# Patient Record
Sex: FEMALE | Race: WHITE
[De-identification: names, ages, dates, MRNs, and addresses within clinical notes are randomized per-mention and may not be internally consistent; named-entity substitution may affect disease eponyms.]

---

## 2019-09-11 ENCOUNTER — HOSPITAL ENCOUNTER (EMERGENCY)
Dept: HOSPITAL 63 - ER | Age: 29
Discharge: ANOTHER HOSPITAL | End: 2019-09-11
Payer: COMMERCIAL

## 2019-09-11 ENCOUNTER — HOSPITAL ENCOUNTER (INPATIENT)
Dept: HOSPITAL 61 - 4 NORTH | Age: 29
LOS: 1 days | Discharge: HOME | DRG: 343 | End: 2019-09-12
Attending: FAMILY MEDICINE | Admitting: FAMILY MEDICINE
Payer: COMMERCIAL

## 2019-09-11 VITALS — SYSTOLIC BLOOD PRESSURE: 99 MMHG | DIASTOLIC BLOOD PRESSURE: 52 MMHG

## 2019-09-11 VITALS — DIASTOLIC BLOOD PRESSURE: 64 MMHG | SYSTOLIC BLOOD PRESSURE: 110 MMHG

## 2019-09-11 VITALS — SYSTOLIC BLOOD PRESSURE: 116 MMHG | DIASTOLIC BLOOD PRESSURE: 68 MMHG

## 2019-09-11 VITALS — HEIGHT: 71 IN | WEIGHT: 179.12 LBS | BODY MASS INDEX: 25.08 KG/M2

## 2019-09-11 VITALS — DIASTOLIC BLOOD PRESSURE: 62 MMHG | SYSTOLIC BLOOD PRESSURE: 113 MMHG

## 2019-09-11 VITALS — DIASTOLIC BLOOD PRESSURE: 61 MMHG | SYSTOLIC BLOOD PRESSURE: 110 MMHG

## 2019-09-11 VITALS — DIASTOLIC BLOOD PRESSURE: 64 MMHG | SYSTOLIC BLOOD PRESSURE: 109 MMHG

## 2019-09-11 VITALS — SYSTOLIC BLOOD PRESSURE: 111 MMHG | DIASTOLIC BLOOD PRESSURE: 67 MMHG

## 2019-09-11 VITALS — SYSTOLIC BLOOD PRESSURE: 115 MMHG | DIASTOLIC BLOOD PRESSURE: 63 MMHG

## 2019-09-11 VITALS — DIASTOLIC BLOOD PRESSURE: 51 MMHG | SYSTOLIC BLOOD PRESSURE: 97 MMHG

## 2019-09-11 VITALS — SYSTOLIC BLOOD PRESSURE: 109 MMHG | DIASTOLIC BLOOD PRESSURE: 64 MMHG

## 2019-09-11 DIAGNOSIS — Z79.899: ICD-10-CM

## 2019-09-11 DIAGNOSIS — K35.80: Primary | ICD-10-CM

## 2019-09-11 LAB
ALBUMIN SERPL-MCNC: 3.9 G/DL (ref 3.4–5)
ALBUMIN SERPL-MCNC: 3.9 G/DL (ref 3.4–5)
ALBUMIN/GLOB SERPL: 1.1 {RATIO} (ref 1–1.7)
ALBUMIN/GLOB SERPL: 1.1 {RATIO} (ref 1–1.7)
ALP SERPL-CCNC: 53 U/L (ref 46–116)
ALP SERPL-CCNC: 53 U/L (ref 46–116)
ALT SERPL-CCNC: 17 U/L (ref 14–59)
ALT SERPL-CCNC: 20 U/L (ref 14–59)
ANION GAP SERPL CALC-SCNC: 10 MMOL/L (ref 6–14)
ANION GAP SERPL CALC-SCNC: 10 MMOL/L (ref 6–14)
APTT PPP: YELLOW S
AST SERPL-CCNC: 16 U/L (ref 15–37)
AST SERPL-CCNC: 18 U/L (ref 15–37)
BACTERIA #/AREA URNS HPF: 0 /HPF
BASOPHILS # BLD AUTO: 0 X10^3/UL (ref 0–0.2)
BASOPHILS # BLD AUTO: 0.1 X10^3/UL (ref 0–0.2)
BASOPHILS NFR BLD: 0 % (ref 0–3)
BASOPHILS NFR BLD: 1 % (ref 0–3)
BILIRUB SERPL-MCNC: 0.2 MG/DL (ref 0.2–1)
BILIRUB SERPL-MCNC: 0.6 MG/DL (ref 0.2–1)
BILIRUB UR QL STRIP: (no result)
BUN SERPL-MCNC: 9 MG/DL (ref 7–20)
BUN/CREAT SERPL: 11 (ref 6–20)
BUN/CREAT SERPL: 12 (ref 6–20)
CA-I SERPL ISE-MCNC: 12 MG/DL (ref 7–20)
CALCIUM SERPL-MCNC: 9.2 MG/DL (ref 8.5–10.1)
CALCIUM SERPL-MCNC: 9.5 MG/DL (ref 8.5–10.1)
CHLORIDE SERPL-SCNC: 103 MMOL/L (ref 98–107)
CHLORIDE SERPL-SCNC: 106 MMOL/L (ref 98–107)
CO2 SERPL-SCNC: 28 MMOL/L (ref 21–32)
CO2 SERPL-SCNC: 29 MMOL/L (ref 21–32)
CREAT SERPL-MCNC: 0.8 MG/DL (ref 0.6–1)
CREAT SERPL-MCNC: 1 MG/DL (ref 0.6–1)
EOSINOPHIL NFR BLD: 0 % (ref 0–3)
EOSINOPHIL NFR BLD: 0 % (ref 0–3)
EOSINOPHIL NFR BLD: 0 X10^3/UL (ref 0–0.7)
EOSINOPHIL NFR BLD: 0 X10^3/UL (ref 0–0.7)
ERYTHROCYTE [DISTWIDTH] IN BLOOD BY AUTOMATED COUNT: 12.7 % (ref 11.5–14.5)
ERYTHROCYTE [DISTWIDTH] IN BLOOD BY AUTOMATED COUNT: 12.9 % (ref 11.5–14.5)
FIBRINOGEN PPP-MCNC: CLEAR MG/DL
GFR SERPLBLD BASED ON 1.73 SQ M-ARVRAT: 65.1 ML/MIN
GFR SERPLBLD BASED ON 1.73 SQ M-ARVRAT: 84.2 ML/MIN
GLOBULIN SER-MCNC: 3.6 G/DL (ref 2.2–3.8)
GLOBULIN SER-MCNC: 3.6 G/DL (ref 2.2–3.8)
GLUCOSE SERPL-MCNC: 105 MG/DL (ref 70–99)
GLUCOSE SERPL-MCNC: 89 MG/DL (ref 70–99)
GLUCOSE UR STRIP-MCNC: (no result) MG/DL
HCT VFR BLD CALC: 40.9 % (ref 36–47)
HCT VFR BLD CALC: 42.5 % (ref 36–47)
HGB BLD-MCNC: 14 G/DL (ref 12–15.5)
HGB BLD-MCNC: 14.1 G/DL (ref 12–15.5)
LIPASE: 136 U/L (ref 73–393)
LYMPHOCYTES # BLD: 1.7 X10^3/UL (ref 1–4.8)
LYMPHOCYTES # BLD: 1.7 X10^3/UL (ref 1–4.8)
LYMPHOCYTES NFR BLD AUTO: 16 % (ref 24–48)
LYMPHOCYTES NFR BLD AUTO: 16 % (ref 24–48)
MCH RBC QN AUTO: 29 PG (ref 25–35)
MCH RBC QN AUTO: 30 PG (ref 25–35)
MCHC RBC AUTO-ENTMCNC: 33 G/DL (ref 31–37)
MCHC RBC AUTO-ENTMCNC: 34 G/DL (ref 31–37)
MCV RBC AUTO: 87 FL (ref 79–100)
MCV RBC AUTO: 88 FL (ref 79–100)
MONO #: 0.5 X10^3/UL (ref 0–1.1)
MONO #: 0.6 X10^3/UL (ref 0–1.1)
MONOCYTES NFR BLD: 5 % (ref 0–9)
MONOCYTES NFR BLD: 5 % (ref 0–9)
NEUT #: 8.6 X10^3/UL (ref 1.8–7.7)
NEUT #: 8.6 X10^3UL (ref 1.8–7.7)
NEUTROPHILS NFR BLD AUTO: 78 % (ref 31–73)
NEUTROPHILS NFR BLD AUTO: 79 % (ref 31–73)
NITRITE UR QL STRIP: (no result)
PLATELET # BLD AUTO: 256 X10^3/UL (ref 140–400)
PLATELET # BLD AUTO: 278 X10^3/UL (ref 140–400)
POTASSIUM SERPL-SCNC: 3.6 MMOL/L (ref 3.5–5.1)
POTASSIUM SERPL-SCNC: 3.9 MMOL/L (ref 3.5–5.1)
PROT SERPL-MCNC: 7.5 G/DL (ref 6.4–8.2)
PROT SERPL-MCNC: 7.5 G/DL (ref 6.4–8.2)
RBC # BLD AUTO: 4.72 X10^6/UL (ref 3.5–5.4)
RBC # BLD AUTO: 4.81 X10^6/UL (ref 3.5–5.4)
RBC #/AREA URNS HPF: 0 /HPF (ref 0–2)
SODIUM SERPL-SCNC: 142 MMOL/L (ref 136–145)
SODIUM SERPL-SCNC: 144 MMOL/L (ref 136–145)
SP GR UR STRIP: 1.01
SQUAMOUS #/AREA URNS LPF: (no result) /LPF
UROBILINOGEN UR-MCNC: 0.2 MG/DL
WBC # BLD AUTO: 10.9 X10^3/UL (ref 4–11)
WBC # BLD AUTO: 10.9 X10^3/UL (ref 4–11)
WBC #/AREA URNS HPF: (no result) /HPF (ref 0–4)

## 2019-09-11 PROCEDURE — 80048 BASIC METABOLIC PNL TOTAL CA: CPT

## 2019-09-11 PROCEDURE — 80053 COMPREHEN METABOLIC PANEL: CPT

## 2019-09-11 PROCEDURE — 85025 COMPLETE CBC W/AUTO DIFF WBC: CPT

## 2019-09-11 PROCEDURE — 81025 URINE PREGNANCY TEST: CPT

## 2019-09-11 PROCEDURE — 36415 COLL VENOUS BLD VENIPUNCTURE: CPT

## 2019-09-11 PROCEDURE — 81001 URINALYSIS AUTO W/SCOPE: CPT

## 2019-09-11 PROCEDURE — G0378 HOSPITAL OBSERVATION PER HR: HCPCS

## 2019-09-11 PROCEDURE — 83690 ASSAY OF LIPASE: CPT

## 2019-09-11 PROCEDURE — 74177 CT ABD & PELVIS W/CONTRAST: CPT

## 2019-09-11 PROCEDURE — 0DTJ4ZZ RESECTION OF APPENDIX, PERCUTANEOUS ENDOSCOPIC APPROACH: ICD-10-PCS | Performed by: SURGERY

## 2019-09-11 PROCEDURE — 96365 THER/PROPH/DIAG IV INF INIT: CPT

## 2019-09-11 PROCEDURE — 99285 EMERGENCY DEPT VISIT HI MDM: CPT

## 2019-09-11 PROCEDURE — A7015 AEROSOL MASK USED W NEBULIZE: HCPCS

## 2019-09-11 NOTE — PDOC4
Operative Note


Operative Note


Preoperative Diagnosis: Acute Appendicitis





Postoperative Diagnosis: Same





Procedure: Laparoscopic appendectomy





Surgeon: Kahlil





Anesthesia: Gen.





EBL: 10 mL





Specimen: Appendix to pathology





Drains: None





Complications: None





Indication: The patient is a 28-year-old female who reported to the emergency 

department with abdominal pain. The evaluation is consistent with acute 

appendicitis. The patient was offered surgical treatment with a laparoscopic 

appendectomy. The risks of surgery were discussed which include bleeding, 

infection, visceral injury, pain, anesthetic risk, potential need for additional

surgery or procedure. The patient understands and would like to proceed.





Description:  The patient was taken to the operating room and placed supine on 

the operating table. Gen. anesthesia was performed. The abdomen was prepped with

ChloraPrep and draped in a standard surgical manner. A supraumbilical incision 

was made through which a veress needle was inserted and a  pneumoperitoneum was 

created.  A visualized 5 mm trocar was inserted and the laparoscope was intr

oduced.  In the left lower quadrant a 5 mm trocar was inserted. In the 

suprapubic region a 12 mm trocar was inserted.  The appendix was identified and 

appeared inflamed consistent with early acute appendicitis. There was no clear 

evidence of perforation or periappendiceal abscess. The mesoappendix was bluntly

 from the appendix. The mesoappendix was controlled using several clips

and it was divided. The appendix was then amputated off the cecum using an Endo 

KATHI 45 stapling device. The appendix was then placed in an endoscopic bag and 

extracted at the suprapubic incision site. The fascia there was closed with 0 

Vicryl and infiltrated with half percent Marcaine with epinephrine.  The RLQ was

visualized and the staple line appeared well intact and hemostasis was good. No 

other abnormalities were identified grossly. The remaining ports were removed 

and the pneumoperitoneum was relieved. The skin at all incision sites was closed

with 4-0 Monocryl. Steri-Strips and dressings were applied. The patient tole

rated the procedure well and was sent to the recovery room in stable condition. 

At the end of the case all counts were correct.











RAUL BYNUM MD             Sep 11, 2019 17:09

## 2019-09-11 NOTE — RAD
CT ABD PELV W/ IV CONTRST ONLY 

 

History: Right lower quadrant abdominal pain 

 

Comparison: None.

 

Technique: After administration of intravenous contrast, helical CT of the

abdomen and pelvis was performed from the lung bases through the ischial 

tuberosities. Coronal and sagittal reconstructions were obtained. 75 mL of

Omnipaque 300 were used. One or more of the following dose reduction 

techniques were utilized: Automated exposure control (AEC), Adjustment of 

mA and/or kV according to patient size, Use of iterative reconstruction 

technique such as ASiR, CT scan done according to ALARA and image 

gently/image wisely

 

Abdomen Findings:

The visualized lung bases are clear.

 

The liver, gallbladder, pancreas, spleen, and bilateral adrenal glands are

normal. 

 

Symmetric renal enhancement. There is no focal renal mass. There is no 

hydronephrosis.   

 

The visualized loops of small bowel are normal. The visualized loops of 

large bowel are normal. There is no evidence of bowel obstruction. 

Slightly dilated appendix with mucosal hyperemia measuring 7 mm in 

diameter. Mild periappendiceal inflammation. No free air. No organized 

fluid collection. 

 

There is no mesenteric or retroperitoneal adenopathy. 

 

The abdominal aorta is normal in caliber. 

 

Pelvis Findings:

Urinary bladder is normal. Trace pelvic free fluid. There is no pelvic or 

inguinal adenopathy.  2.6 cm right ovarian cyst, incompletely 

characterized. Septate uterus.

 

There is no acute bony abnormality.  

 

IMPRESSION:

Slightly dilated and hyperemic appendix with mild surrounding 

inflammation, concerning for early/developing acute appendicitis. No free 

air. No organized fluid collection.

 

Incidental note of a suspected septate uterus.

 

Electronically signed by: Beau Lopez MD (9/11/2019 6:49 AM) [facility]

## 2019-09-11 NOTE — PDOC2
CONSULT


Date of Consult


Date of Consult


DATE: 19 


TIME: 13:40





History of Present Illness


Reason for Visit:


The patient is a 28-year-old female who noticed abdominal pain that started 

yesterday. The pain was initially in the lower midabdomen but earlier this 

morning localized to the right lower quadrant. She denies any associated nausea 

or vomiting but has felt "feverish".  She reported to the emergency department 

at [facility] and her evaluation was suggestive of possible appendicitis. She 

was transferred for definitive care.





Past Medical History


Past Medical History


Denies





Past Surgical History


Past Surgical History








Family History


Family History:  High Cholestrol





Social History


Social History:  Parent


No


ALCOHOL:  none


Drugs:  None





Current Medications


Current Medications





Current Medications


Bupivacaine HCl/ Epinephrine Bitart (Sensorcain-Epi 0.5%-1:230209 Mpf) 30 ml 1X 

ONCE INJ ;  Start 19 at 12:30;  Stop 19 at 12:31;  Status DC


Ondansetron HCl (Zofran) 4 mg PRN Q6HRS  PRN IV NAUSEA/VOMITING;  Start 19 

at 13:00;  Stop 19 at 20:00


Fentanyl Citrate (Fentanyl 2ml Vial) 25 mcg PRN Q5MIN  PRN IV MILD PAIN 1-3;  

Start 19 at 13:00;  Stop 19 at 20:00


Fentanyl Citrate (Fentanyl 2ml Vial) 50 mcg PRN Q5MIN  PRN IV MODERATE TO SEVERE

PAIN;  Start 19 at 13:00;  Stop 19 at 20:00


Morphine Sulfate (Morphine Sulfate) 1 mg PRN Q10MIN  PRN IV SEVERE PAIN 7-10;  

Start 19 at 13:00;  Stop 19 at 20:00


Ringer's Solution 1,000 ml @  30 mls/hr Q24H IV ;  Start 19 at 12:47;  Stop

19 at 00:46


Hydromorphone HCl (Dilaudid) 0.5 mg PRN Q10MIN  PRN IV SEV PAIN, Second choice; 

Start 19 at 13:00;  Stop 19 at 20:00


Prochlorperazine Edisylate (Compazine) 5 mg PACU PRN  PRN IV NAUSEA, MRX1;  

Start 19 at 13:00;  Stop 19 at 20:00





Allergies


Allergies:  


Coded Allergies:  


     No Known Drug Allergies (Unverified , 19)





ROS


General:  No: Chills, Night Sweats, Fatigue, Malaise, Appetite, Other


PSYCHOLOGICAL ROS:  No: Anxiety, Behavioral Disorder, Concentration difficultie,

Decreased libido, Depression, Disorientation, Hallucinations, Hostility, 

Irritablity, Memory difficulties, Mood Swings, Obsessive thoughts, Physical 

abuse, Sexual abuse, Sleep disturbances, Suicidal ideation, Other


Eyes:  No Blurry vision, No Decreased vision, No Double vision, No Dry eyes, No 

Excessive tearing, No Eye Pain, No Itchy Eyes, No Loss of vision, No 

Photophobia, No Scotomata, No Uses contacts, No Uses glasses, No Other


HEENT:  No: Heacaches, Visual Changes, Hearing change, Nasal congestion, Nasal 

discharge, Oral lesions, Sinus pain, Sore Throat, Epistaxis, Sneezing, Snoring, 

Tinnitus, Vertigo, Vocal changes, Other


ALLERGY AND IMMUNOLOGY:  No: Hives, Insect Bite Sensitivity, Itchy/Watery Eyes, 

Nasal Congestion, Post Nasal Drip, Seasonal Allergies, Other


Hematological and Lymphatic:  No: Bleeding Problems, Blood Clots, Blood 

Transfusions, Brusing, Night Sweats, Pallor, Swollen Lymph Nodes, Other


Respiratory:  No: Cough, Hemoptysis, Orthopnea, Pleuritic Pain, Shortness of 

breath, SOB with excertion, Sputum Changes, Stridor, Tachypnea, Wheezing, Other


Cardiovascular:  No Chest Pain, No Palpitations, No Orthopnea, No Paroxysmal 

Noc. Dyspnea, No Edema, No Lt Headedness, No Other


Gastrointestinal:  Yes Abdominal Pain


Genitourinary:  No Dysuria, No Frequency, No Incontinence, No Hematuria, No 

Retention, No Discharge, No Urgency, No Pain, No Flank Pain, No Other, No , No ,

No , No , No , No , No 


Musculoskeletal:  No Gait Disturbance, No Joint Pain, No Joint Stiffness, No 

Joint Swelling, No Muscle Pain, No Muscular Weakness, No Pain In:, No Swelling 

In:, No Other


Neurological:  No Behavorial Changes, No Bowel/Bladder ControlChng, No 

Confusion, No Dizziness, No Gait Disturbance, No Headaches, No Impaired 

Coord/balance, No Memory Loss, No Numbness/Tingling, No Seizures, No Speech 

Problems, No Tremors, No Visual Changes, No Weakness, No Other


Skin:  No Dry Skin, No Eczema, No Hair Changes, No Lumps, No Mole Changes, No 

Mottling, No Nail Changes, No Pruritus, No Rash, No Skin Lesion Changes, No 

Other, No Acne





Physical Exam


General:  Alert, Oriented X3, Cooperative


HEENT:  Atraumatic


Lungs:  Clear to auscultation


Heart:  Regular rate


Abdomen:  Soft (tender in the RLQ)


Extremities:  No clubbing, No cyanosis


Skin:  No rashes


Neuro:  Normal speech, Strength at 5/5 X4 ext


Psych/Mental Status:  Mental status NL





Vitals


VITALS





Vital Signs








  Date Time  Temp Pulse Resp B/P (MAP) Pulse Ox O2 Delivery O2 Flow Rate FiO2


 


19 12:05      Room Air  











Assessment/Plan


Assessment/Plan


RLQ pain, possible early appendicitis;  recommend exploratory laparoscopy, 

likely appendectomy.  The details and risks of surgery were discussed with the 

patient.  She understands and would like to proceed.











RAUL BYNUM MD             Sep 11, 2019 13:43

## 2019-09-11 NOTE — PHYS DOC
Adult General


Chief Complaint


Chief Complaint:  abdominal pain





HPI


HPI





Patient is a 28-year-old female who presents with complaint of lower abdominal 

pain that started yesterday. She states that initially she thought the pain was 

associated with her scar from recent  from 8 weeks ago. She states that

the pain had improved and she went to bed, but at about 3:30 AM, she really woke

with pain located in the right lower quadrant. She rates pain at an 8 out of 10.

She does admit to nausea but is had no vomiting. She describes the pain as sharp

in nature and states the pain is worsened with any movement. She denies any 

fever or anorexia. She denies any vaginal discharge or bleeding.[]


 (GLENROY GRIFFIN Jr. DO)





Review of Systems


Review of Systems





Constitutional: Denies fever or chills []


Respiratory: Denies cough or shortness of breath []


Cardiovascular: No additional information not addressed in HPI []


GI: Complains of right lower quadrant abdominal pain with nausea. Denies 

vomiting or diarrhea []


: Denies dysuria or hematuria []


Neurologic: Denies headache, focal weakness or sensory changes []





All other systems were reviewed and found to be within normal limits, except as 

documented in this note.


 (GLENROY GRIFFIN Jr. DO)





Current Medications


Current Medications





Current Medications








 Medications


  (Trade)  Dose


 Ordered  Sig/Santino  Start Time


 Stop Time Status Last Admin


Dose Admin


 


 Fentanyl Citrate


  (Fentanyl 2ml


 Vial)  50 mcg  PRN Q15MIN  PRN  19 05:15


 19 05:14 UNV  





 


 Ondansetron HCl


  (Zofran)  4 mg  1X  ONCE  19 05:15


 19 05:16 UNV  





 


 Sodium Chloride  1,000 ml @ 


 1,000 mls/hr  Q1H  19 05:12


 19 06:11 UNV  











 (GLENROY GRIFFIN Jr. DO)





Physical Exam


Physical Exam





Constitutional: Well developed, well nourished, no acute distress, non-toxic 

appearance. []


HENT: Normocephalic, atraumatic, bilateral external ears normal, oropharynx 

moist, no oral exudates, nose normal. []


Eyes: PERRLA, EOMI, conjunctiva normal, no discharge. [] 


Neck: Normal range of motion, no tenderness, supple, no stridor. [] 


Cardiovascular:Heart rate regular rhythm, no murmur []


Lungs & Thorax:  Bilateral breath sounds clear to auscultation []


Abdomen: Bowel sounds diminished, soft, with moderate tenderness to palpation in

 the right lower abdomen. Positive rebound tenderness is noted. [] 


Skin: Warm, dry, no erythema, no rash. [] 


Extremities: No tenderness, no cyanosis, no clubbing, ROM intact. [] 


Neurologic: Alert and oriented X 3, no focal deficits noted. []


 (GLENROY GRIFFIN Jr. DO)





EKG


EKG


[]


 (GLENROY GRIFFIN Jr., DO)





Radiology/Procedures


Radiology/Procedures


[]


 (GLENROY GRIFFIN Jr., DO)


Radiology/Procedures


[facility] [address], [place] [address]


                                 [phone]


                                        


                                 IMAGING REPORT





                                     Signed





PATIENT: CANDACE MCDANIEL  ACCOUNT: [id number]     MRN#: [id number]


: 1990           LOCATION: ER              AGE: 28


SEX: F                    EXAM DT: 19         ACCESSION#: [id number]


STATUS: REG ER            ORD. PHYSICIAN: GLENROY GRIFFIN Jr., DO


REASON: RLQ abd pain


PROCEDURE: CT ABD PELV W/ IV CONTRST ONLY





CT ABD PELV W/ IV CONTRST ONLY 


 


History: Right lower quadrant abdominal pain 


 


Comparison: None.


 


Technique: After administration of intravenous contrast, helical CT of the


abdomen and pelvis was performed from the lung bases through the ischial 


tuberosities. Coronal and sagittal reconstructions were obtained. 75 mL of


Omnipaque 300 were used. One or more of the following dose reduction 


techniques were utilized: Automated exposure control (AEC), Adjustment of 


mA and/or kV according to patient size, Use of iterative reconstruction 


technique such as ASiR, CT scan done according to ALARA and image 


gently/image wisely


 


Abdomen Findings:


The visualized lung bases are clear.


 


The liver, gallbladder, pancreas, spleen, and bilateral adrenal glands are


normal. 


 


Symmetric renal enhancement. There is no focal renal mass. There is no 


hydronephrosis.   


 


The visualized loops of small bowel are normal. The visualized loops of 


large bowel are normal. There is no evidence of bowel obstruction. 


Slightly dilated appendix with mucosal hyperemia measuring 7 mm in 


diameter. Mild periappendiceal inflammation. No free air. No organized 


fluid collection. 


 


There is no mesenteric or retroperitoneal adenopathy. 


 


The abdominal aorta is normal in caliber. 


 


Pelvis Findings:


Urinary bladder is normal. Trace pelvic free fluid. There is no pelvic or 


inguinal adenopathy.  2.6 cm right ovarian cyst, incompletely 


characterized. Septate uterus.


 


There is no acute bony abnormality.  


 


IMPRESSION:


Slightly dilated and hyperemic appendix with mild surrounding 


inflammation, concerning for early/developing acute appendicitis. No free 


air. No organized fluid collection.


 


Incidental note of a suspected septate uterus.


 


Electronically signed by: Sara Lopez MD (2019 6:49 AM) [facility]














DICTATED AND SIGNED BY:     SARA LOPEZ MD


DATE:     19 0649





CC: GLENROY GRIFFIN Jr., DO; BRUNNER,JANE P; DWIGHT ARAGON MD ~





 (DWIGHT ARAGON MD)


Course & Med Decision Making


Course & Med Decision Making


Pertinent Labs and Imaging studies reviewed. (See chart for details)





Patient moved to room upon arrival was evaluated by ER medical staff after which

 workup was initiated to include blood work, a UA and ultimately a CT of the 

abdomen and pelvis with IV contrast. Blood work and UA have returned essentially

 unremarkable. At this time, CT of the abdomen and pelvis is pending and patient

 is being signed out to the oncoming ER physician, Dr. Aragon, at 6:00 AM.


 (GLENROY GRIFFIN Jr., DO)


Course & Med Decision Making


28-year-old female presents to the emergency department with complaints of 

abdominal pain. Imaging reviewed with evidence of appendicitis. Carson call on-call

 surgeon (DR POSEY) and plan for transfer to [facility]. 

Discussed admission with hospitalist (PAUL Carlson).  Patient would like to 

go POV.  Will dc her from the ER with direct admission to [facility].


 (DWIGHT ARAGON MD)


Dragon Disclaimer


Dragon Disclaimer


This electronic medical record was generated, in whole or in part, using a voice

 recognition dictation system.


 (GLENROY GRIFFIN Jr., DO)





Departure


Departure:


Impression:  


   Primary Impression:  


   Appendicitis


Disposition:   XFER [facility]-TRM HOSP


Condition:  STABLE


Referrals:  


BRUNNER,JANE P (PCP)


Patient Instructions:  Appendicitis





Problem Qualifiers








   Primary Impression:  


   Appendicitis


   Appendicitis type:  acute appendicitis  Acute appendicitis type:  unspecified

    acute appendicitis type  Qualified Codes:  K35.80 - Unspecified acute 

   appendicitis








GLENROY GRIFFIN Jr., DO          Sep 11, 2019 05:26


DWIGHT ARAGON MD              Sep 11, 2019 07:02

## 2019-09-11 NOTE — PDOC1
History and Physical


Date of Admission


Date of Admission


DATE: 19 


TIME: 12:18





Identification/Chief Complaint


Chief Complaint


SEEN IN [facility] ER WITH RLQ ACUTE PAIN, DILATED APPENDIX ON CT [facility]





Family History


Family History:  High Cholestrol


Family History:  Parent





Social History


Smoke:  No


ALCOHOL:  none


Drugs:  None





Allergies


Allergies:  


Coded Allergies:  


     No Known Drug Allergies (Unverified , 19)





ROS


General:  YES: Chills; 


   No: Night Sweats, Fatigue, Malaise, Appetite, Other


PSYCHOLOGICAL ROS:  No: Anxiety, Behavioral Disorder, Concentration difficultie,

Decreased libido, Depression, Disorientation, Hallucinations, Hostility, 

Irritablity, Memory difficulties, Mood Swings, Obsessive thoughts, Physical 

abuse, Sexual abuse, Sleep disturbances, Suicidal ideation, Other


Eyes:  No Blurry vision, No Decreased vision, No Double vision, No Dry eyes, No 

Excessive tearing, No Eye Pain, No Itchy Eyes, No Loss of vision, No 

Photophobia, No Scotomata, No Uses contacts, No Uses glasses, No Other


HEENT:  No: Heacaches, Visual Changes, Hearing change, Nasal congestion, Nasal 

discharge, Oral lesions, Sinus pain, Sore Throat, Epistaxis, Sneezing, Snoring, 

Tinnitus, Vertigo, Vocal changes, Other


ALLERGY AND IMMUNOLOGY:  No: Hives, Insect Bite Sensitivity, Itchy/Watery Eyes, 

Nasal Congestion, Post Nasal Drip, Seasonal Allergies, Other


Hematological and Lymphatic:  No: Bleeding Problems, Blood Clots, Blood 

Transfusions, Brusing, Night Sweats, Pallor, Swollen Lymph Nodes, Other


ENDOCRINE:  No: Breast Changes, Galactorrhea, Hair Pattern Changes, Hot Flashes,

Malaise/lethargy, Mood Swings, Palpitations, Polydipsia/polyuria, Skin Changes, 

Temperature Intolerance, Unexpected Weight Changes, Other


Breast:  No New/Changing Breast Lumps, No Nipple changes, No Nipple discharge, 

No Other


Respiratory:  No: Cough, Hemoptysis, Orthopnea, Pleuritic Pain, Shortness of 

breath, SOB with excertion, Sputum Changes, Stridor, Tachypnea, Wheezing, Other


Cardiovascular:  No Chest Pain, No Palpitations, No Orthopnea, No Paroxysmal 

Noc. Dyspnea, No Edema, No Lt Headedness, No Other


Gastrointestinal:  Yes Abdominal Pain, Yes Other (rlq discomfort x 18 hrs)


Genitourinary:  No Dysuria, No Frequency, No Incontinence, No Hematuria, No Ret

ention, No Discharge, No Urgency, No Pain, No Flank Pain, No Other, No , No , No

, No , No , No , No 


Musculoskeletal:  No Gait Disturbance, No Joint Pain, No Joint Stiffness, No 

Joint Swelling, No Muscle Pain, No Muscular Weakness, No Pain In:, No Swelling 

In:, No Other


Neurological:  No Behavorial Changes, No Bowel/Bladder ControlChng, No 

Confusion, No Dizziness, No Gait Disturbance, No Headaches, No Impaired 

Coord/balance, No Memory Loss, No Numbness/Tingling, No Seizures, No Speech 

Problems, No Tremors, No Visual Changes, No Weakness, No Other


Skin:  No Dry Skin, No Eczema, No Hair Changes, No Lumps, No Mole Changes, No 

Mottling, No Nail Changes, No Pruritus, No Rash, No Skin Lesion Changes, No 

Other, No Acne





Physical Exam


General:  Alert, Oriented X3, Cooperative, No acute distress, mild distress


HEENT:  Atraumatic, PERRLA


Lungs:  Clear to auscultation, Normal air movement


Heart:  S1S2, RRR, no thrills, no gallops, no murmurs, no jug vein distention


Breasts:  Not examined


Abdomen:  Normal bowel sounds, No hepatosplenomegaly, Other (rlq tender)


Rectal Exam:  not examined


PELVIC:  Examination not indicated


Extremities:  No cyanosis


Neuro:  Normal speech, Strength at 5/5 X4 ext, Sensation intact, Cranial nerves 

3-12 NL


Psych/Mental Status:  Mental status NL, Mood NL





VTE Prophylaxis Ordered


VTE Prophylaxis Devices:  Yes


VTE Pharmacological Prophylaxi:  Yes





Assessment/Plan


Assessment/Plan


impression





1. acute RLQ PAIN suspect acute appendicitis


2. recent 











PLAN


ADMIT


NPO


CONSULT DR ZURITA


IV FLUIDS











58 MIN PT EXAM, CHART REVIEW, > 50% OF TIME SPENT WITH EXAM, CHART REVIEW, PT 

CARE COORDINATION











SAVANNA LOVE MD          Sep 11, 2019 12:18

## 2019-09-12 VITALS — DIASTOLIC BLOOD PRESSURE: 55 MMHG | SYSTOLIC BLOOD PRESSURE: 104 MMHG

## 2019-09-12 VITALS — DIASTOLIC BLOOD PRESSURE: 48 MMHG | SYSTOLIC BLOOD PRESSURE: 98 MMHG

## 2019-09-12 VITALS — DIASTOLIC BLOOD PRESSURE: 53 MMHG | SYSTOLIC BLOOD PRESSURE: 97 MMHG

## 2019-09-12 LAB
ANION GAP SERPL CALC-SCNC: 11 MMOL/L (ref 6–14)
BASOPHILS # BLD AUTO: 0 X10^3/UL (ref 0–0.2)
BASOPHILS NFR BLD: 0 % (ref 0–3)
BUN SERPL-MCNC: 13 MG/DL (ref 7–20)
CALCIUM SERPL-MCNC: 8.6 MG/DL (ref 8.5–10.1)
CHLORIDE SERPL-SCNC: 105 MMOL/L (ref 98–107)
CO2 SERPL-SCNC: 26 MMOL/L (ref 21–32)
CREAT SERPL-MCNC: 0.8 MG/DL (ref 0.6–1)
EOSINOPHIL NFR BLD: 0 % (ref 0–3)
EOSINOPHIL NFR BLD: 0 X10^3/UL (ref 0–0.7)
ERYTHROCYTE [DISTWIDTH] IN BLOOD BY AUTOMATED COUNT: 13.1 % (ref 11.5–14.5)
GFR SERPLBLD BASED ON 1.73 SQ M-ARVRAT: 84.2 ML/MIN
GLUCOSE SERPL-MCNC: 90 MG/DL (ref 70–99)
HCT VFR BLD CALC: 39.2 % (ref 36–47)
HGB BLD-MCNC: 13.3 G/DL (ref 12–15.5)
LYMPHOCYTES # BLD: 0.8 X10^3/UL (ref 1–4.8)
LYMPHOCYTES NFR BLD AUTO: 10 % (ref 24–48)
MCH RBC QN AUTO: 30 PG (ref 25–35)
MCHC RBC AUTO-ENTMCNC: 34 G/DL (ref 31–37)
MCV RBC AUTO: 88 FL (ref 79–100)
MONO #: 0.4 X10^3/UL (ref 0–1.1)
MONOCYTES NFR BLD: 5 % (ref 0–9)
NEUT #: 7.4 X10^3/UL (ref 1.8–7.7)
NEUTROPHILS NFR BLD AUTO: 85 % (ref 31–73)
PLATELET # BLD AUTO: 232 X10^3/UL (ref 140–400)
POTASSIUM SERPL-SCNC: 4 MMOL/L (ref 3.5–5.1)
RBC # BLD AUTO: 4.46 X10^6/UL (ref 3.5–5.4)
SODIUM SERPL-SCNC: 142 MMOL/L (ref 136–145)
WBC # BLD AUTO: 8.7 X10^3/UL (ref 4–11)

## 2019-09-12 NOTE — PDOC3
Discharge Summary


Visit Information


Date of Admission:  Sep 11, 2019


Date of Discharge:  Sep 12, 2019


Admitting Diagnosis Comment:


appendicitis





Brief Hospital Course


Allergies





                                    Allergies








Coded Allergies Type Severity Reaction Last Updated Verified


 


  No Known Drug Allergies    9/11/19 No








Vital Signs





Vital Signs








  Date Time  Temp Pulse Resp B/P (MAP) Pulse Ox O2 Delivery O2 Flow Rate FiO2


 


9/12/19 03:00 98.1 59 18 97/53 (68) 96 Room Air  





 98.1       


 


9/12/19 02:05       12.0 








Lab Results





Laboratory Tests








Test


 9/11/19


13:37 9/12/19


07:46


 


White Blood Count


 10.9 x10^3/uL


(4.0-11.0) 8.7 x10^3/uL


(4.0-11.0)


 


Red Blood Count


 4.72 x10^6/uL


(3.50-5.40) 4.46 x10^6/uL


(3.50-5.40)


 


Hemoglobin


 14.0 g/dL


(12.0-15.5) 13.3 g/dL


(12.0-15.5)


 


Hematocrit


 40.9 %


(36.0-47.0) 39.2 %


(36.0-47.0)


 


Mean Corpuscular Volume 87 fL ()  88 fL () 


 


Mean Corpuscular Hemoglobin 30 pg (25-35)  30 pg (25-35) 


 


Mean Corpuscular Hemoglobin


Concent 34 g/dL


(31-37) 34 g/dL


(31-37)


 


Red Cell Distribution Width


 12.9 %


(11.5-14.5) 13.1 %


(11.5-14.5)


 


Platelet Count


 256 x10^3/uL


(140-400) 232 x10^3/uL


(140-400)


 


Neutrophils (%) (Auto) 79 % (31-73)  85 % (31-73) 


 


Lymphocytes (%) (Auto) 16 % (24-48)  10 % (24-48) 


 


Monocytes (%) (Auto) 5 % (0-9)  5 % (0-9) 


 


Eosinophils (%) (Auto) 0 % (0-3)  0 % (0-3) 


 


Basophils (%) (Auto) 0 % (0-3)  0 % (0-3) 


 


Neutrophils # (Auto)


 8.6 x10^3/uL


(1.8-7.7) 7.4 x10^3/uL


(1.8-7.7)


 


Lymphocytes # (Auto)


 1.7 x10^3/uL


(1.0-4.8) 0.8 x10^3/uL


(1.0-4.8)


 


Monocytes # (Auto)


 0.6 x10^3/uL


(0.0-1.1) 0.4 x10^3/uL


(0.0-1.1)


 


Eosinophils # (Auto)


 0.0 x10^3/uL


(0.0-0.7) 0.0 x10^3/uL


(0.0-0.7)


 


Basophils # (Auto)


 0.0 x10^3/uL


(0.0-0.2) 0.0 x10^3/uL


(0.0-0.2)


 


Sodium Level


 144 mmol/L


(136-145) 142 mmol/L


(136-145)


 


Potassium Level


 3.9 mmol/L


(3.5-5.1) 4.0 mmol/L


(3.5-5.1)


 


Chloride Level


 106 mmol/L


() 105 mmol/L


()


 


Carbon Dioxide Level


 28 mmol/L


(21-32) 26 mmol/L


(21-32)


 


Anion Gap 10 (6-14)  11 (6-14) 


 


Blood Urea Nitrogen


 9 mg/dL (7-20) 


 13 mg/dL


(7-20)


 


Creatinine


 0.8 mg/dL


(0.6-1.0) 0.8 mg/dL


(0.6-1.0)


 


Estimated GFR


(Cockcroft-Gault) 84.2 


 84.2 





 


BUN/Creatinine Ratio 11 (6-20)  


 


Glucose Level


 89 mg/dL


(70-99) 90 mg/dL


(70-99)


 


Calcium Level


 9.5 mg/dL


(8.5-10.1) 8.6 mg/dL


(8.5-10.1)


 


Total Bilirubin


 0.6 mg/dL


(0.2-1.0) 





 


Aspartate Amino Transf


(AST/SGOT) 16 U/L (15-37) 


 





 


Alanine Aminotransferase


(ALT/SGPT) 17 U/L (14-59) 


 





 


Alkaline Phosphatase


 53 U/L


() 





 


Total Protein


 7.5 g/dL


(6.4-8.2) 





 


Albumin


 3.9 g/dL


(3.4-5.0) 





 


Albumin/Globulin Ratio 1.1 (1.0-1.7)  








Laboratory Tests








Test


 9/11/19


13:37 9/12/19


07:46


 


White Blood Count


 10.9 x10^3/uL


(4.0-11.0) 8.7 x10^3/uL


(4.0-11.0)


 


Red Blood Count


 4.72 x10^6/uL


(3.50-5.40) 4.46 x10^6/uL


(3.50-5.40)


 


Hemoglobin


 14.0 g/dL


(12.0-15.5) 13.3 g/dL


(12.0-15.5)


 


Hematocrit


 40.9 %


(36.0-47.0) 39.2 %


(36.0-47.0)


 


Mean Corpuscular Volume 87 fL ()  88 fL () 


 


Mean Corpuscular Hemoglobin 30 pg (25-35)  30 pg (25-35) 


 


Mean Corpuscular Hemoglobin


Concent 34 g/dL


(31-37) 34 g/dL


(31-37)


 


Red Cell Distribution Width


 12.9 %


(11.5-14.5) 13.1 %


(11.5-14.5)


 


Platelet Count


 256 x10^3/uL


(140-400) 232 x10^3/uL


(140-400)


 


Neutrophils (%) (Auto) 79 % (31-73)  85 % (31-73) 


 


Lymphocytes (%) (Auto) 16 % (24-48)  10 % (24-48) 


 


Monocytes (%) (Auto) 5 % (0-9)  5 % (0-9) 


 


Eosinophils (%) (Auto) 0 % (0-3)  0 % (0-3) 


 


Basophils (%) (Auto) 0 % (0-3)  0 % (0-3) 


 


Neutrophils # (Auto)


 8.6 x10^3/uL


(1.8-7.7) 7.4 x10^3/uL


(1.8-7.7)


 


Lymphocytes # (Auto)


 1.7 x10^3/uL


(1.0-4.8) 0.8 x10^3/uL


(1.0-4.8)


 


Monocytes # (Auto)


 0.6 x10^3/uL


(0.0-1.1) 0.4 x10^3/uL


(0.0-1.1)


 


Eosinophils # (Auto)


 0.0 x10^3/uL


(0.0-0.7) 0.0 x10^3/uL


(0.0-0.7)


 


Basophils # (Auto)


 0.0 x10^3/uL


(0.0-0.2) 0.0 x10^3/uL


(0.0-0.2)


 


Sodium Level


 144 mmol/L


(136-145) 142 mmol/L


(136-145)


 


Potassium Level


 3.9 mmol/L


(3.5-5.1) 4.0 mmol/L


(3.5-5.1)


 


Chloride Level


 106 mmol/L


() 105 mmol/L


()


 


Carbon Dioxide Level


 28 mmol/L


(21-32) 26 mmol/L


(21-32)


 


Anion Gap 10 (6-14)  11 (6-14) 


 


Blood Urea Nitrogen


 9 mg/dL (7-20) 


 13 mg/dL


(7-20)


 


Creatinine


 0.8 mg/dL


(0.6-1.0) 0.8 mg/dL


(0.6-1.0)


 


Estimated GFR


(Cockcroft-Gault) 84.2 


 84.2 





 


BUN/Creatinine Ratio 11 (6-20)  


 


Glucose Level


 89 mg/dL


(70-99) 90 mg/dL


(70-99)


 


Calcium Level


 9.5 mg/dL


(8.5-10.1) 8.6 mg/dL


(8.5-10.1)


 


Total Bilirubin


 0.6 mg/dL


(0.2-1.0) 





 


Aspartate Amino Transf


(AST/SGOT) 16 U/L (15-37) 


 





 


Alanine Aminotransferase


(ALT/SGPT) 17 U/L (14-59) 


 





 


Alkaline Phosphatase


 53 U/L


() 





 


Total Protein


 7.5 g/dL


(6.4-8.2) 





 


Albumin


 3.9 g/dL


(3.4-5.0) 





 


Albumin/Globulin Ratio 1.1 (1.0-1.7)  








Brief Hospital Course


Ms. Zambrano  is a 28 old female, admitted for appy, underwent lap appy, 

TOlerated proc, HOme today naproxen BID prn





ff up GS 4 weeks post sx








Discharge Information


Condition at Discharge:  Improved, Stable


Follow Up:  Weeks (4 weeks GS post op)


Disposition/Orders:  D/C to Home











JESÚS LOTT MD           Sep 12, 2019 09:32

## 2019-09-12 NOTE — NUR
SS following for discharge planning. SS reviewed pt chart. Pt is from home with spouse and 
is currently on room air. No discharge needs noted at this time. Discharge order on the 
chart for home with self care.

## 2019-09-12 NOTE — PDOC
SURGICAL PROGRESS NOTE


Subjective


tolerating diet


pain managed


urinating


feels well


Vital Signs





Vital Signs








  Date Time  Temp Pulse Resp B/P (MAP) Pulse Ox O2 Delivery O2 Flow Rate FiO2


 


9/12/19 11:00 98.3 62 18 98/48 (65) 97 Room Air  





 98.3       


 


9/12/19 02:05       12.0 








I&O











Intake and Output 


 


 9/12/19





 07:00


 


Intake Total 1800 ml


 


Output Total 25 ml


 


Balance 1775 ml


 


 


 


Intake IV Total 1800 ml


 


Output Estimated Blood Loss 25 ml


 


# Voids 1








General:  Alert, Oriented X3, Cooperative, No acute distress


Abdomen:  Soft, Other (ND, lap dressings dry)


Labs





Laboratory Tests








Test


 9/11/19


13:37 9/12/19


07:46


 


White Blood Count


 10.9 x10^3/uL


(4.0-11.0) 8.7 x10^3/uL


(4.0-11.0)


 


Red Blood Count


 4.72 x10^6/uL


(3.50-5.40) 4.46 x10^6/uL


(3.50-5.40)


 


Hemoglobin


 14.0 g/dL


(12.0-15.5) 13.3 g/dL


(12.0-15.5)


 


Hematocrit


 40.9 %


(36.0-47.0) 39.2 %


(36.0-47.0)


 


Mean Corpuscular Volume 87 fL ()  88 fL () 


 


Mean Corpuscular Hemoglobin 30 pg (25-35)  30 pg (25-35) 


 


Mean Corpuscular Hemoglobin


Concent 34 g/dL


(31-37) 34 g/dL


(31-37)


 


Red Cell Distribution Width


 12.9 %


(11.5-14.5) 13.1 %


(11.5-14.5)


 


Platelet Count


 256 x10^3/uL


(140-400) 232 x10^3/uL


(140-400)


 


Neutrophils (%) (Auto) 79 % (31-73)  85 % (31-73) 


 


Lymphocytes (%) (Auto) 16 % (24-48)  10 % (24-48) 


 


Monocytes (%) (Auto) 5 % (0-9)  5 % (0-9) 


 


Eosinophils (%) (Auto) 0 % (0-3)  0 % (0-3) 


 


Basophils (%) (Auto) 0 % (0-3)  0 % (0-3) 


 


Neutrophils # (Auto)


 8.6 x10^3/uL


(1.8-7.7) 7.4 x10^3/uL


(1.8-7.7)


 


Lymphocytes # (Auto)


 1.7 x10^3/uL


(1.0-4.8) 0.8 x10^3/uL


(1.0-4.8)


 


Monocytes # (Auto)


 0.6 x10^3/uL


(0.0-1.1) 0.4 x10^3/uL


(0.0-1.1)


 


Eosinophils # (Auto)


 0.0 x10^3/uL


(0.0-0.7) 0.0 x10^3/uL


(0.0-0.7)


 


Basophils # (Auto)


 0.0 x10^3/uL


(0.0-0.2) 0.0 x10^3/uL


(0.0-0.2)


 


Sodium Level


 144 mmol/L


(136-145) 142 mmol/L


(136-145)


 


Potassium Level


 3.9 mmol/L


(3.5-5.1) 4.0 mmol/L


(3.5-5.1)


 


Chloride Level


 106 mmol/L


() 105 mmol/L


()


 


Carbon Dioxide Level


 28 mmol/L


(21-32) 26 mmol/L


(21-32)


 


Anion Gap 10 (6-14)  11 (6-14) 


 


Blood Urea Nitrogen


 9 mg/dL (7-20) 


 13 mg/dL


(7-20)


 


Creatinine


 0.8 mg/dL


(0.6-1.0) 0.8 mg/dL


(0.6-1.0)


 


Estimated GFR


(Cockcroft-Gault) 84.2 


 84.2 





 


BUN/Creatinine Ratio 11 (6-20)  


 


Glucose Level


 89 mg/dL


(70-99) 90 mg/dL


(70-99)


 


Calcium Level


 9.5 mg/dL


(8.5-10.1) 8.6 mg/dL


(8.5-10.1)


 


Total Bilirubin


 0.6 mg/dL


(0.2-1.0) 





 


Aspartate Amino Transf


(AST/SGOT) 16 U/L (15-37) 


 





 


Alanine Aminotransferase


(ALT/SGPT) 17 U/L (14-59) 


 





 


Alkaline Phosphatase


 53 U/L


() 





 


Total Protein


 7.5 g/dL


(6.4-8.2) 





 


Albumin


 3.9 g/dL


(3.4-5.0) 





 


Albumin/Globulin Ratio 1.1 (1.0-1.7)  








Laboratory Tests








Test


 9/11/19


13:37 9/12/19


07:46


 


White Blood Count


 10.9 x10^3/uL


(4.0-11.0) 8.7 x10^3/uL


(4.0-11.0)


 


Red Blood Count


 4.72 x10^6/uL


(3.50-5.40) 4.46 x10^6/uL


(3.50-5.40)


 


Hemoglobin


 14.0 g/dL


(12.0-15.5) 13.3 g/dL


(12.0-15.5)


 


Hematocrit


 40.9 %


(36.0-47.0) 39.2 %


(36.0-47.0)


 


Mean Corpuscular Volume 87 fL ()  88 fL () 


 


Mean Corpuscular Hemoglobin 30 pg (25-35)  30 pg (25-35) 


 


Mean Corpuscular Hemoglobin


Concent 34 g/dL


(31-37) 34 g/dL


(31-37)


 


Red Cell Distribution Width


 12.9 %


(11.5-14.5) 13.1 %


(11.5-14.5)


 


Platelet Count


 256 x10^3/uL


(140-400) 232 x10^3/uL


(140-400)


 


Neutrophils (%) (Auto) 79 % (31-73)  85 % (31-73) 


 


Lymphocytes (%) (Auto) 16 % (24-48)  10 % (24-48) 


 


Monocytes (%) (Auto) 5 % (0-9)  5 % (0-9) 


 


Eosinophils (%) (Auto) 0 % (0-3)  0 % (0-3) 


 


Basophils (%) (Auto) 0 % (0-3)  0 % (0-3) 


 


Neutrophils # (Auto)


 8.6 x10^3/uL


(1.8-7.7) 7.4 x10^3/uL


(1.8-7.7)


 


Lymphocytes # (Auto)


 1.7 x10^3/uL


(1.0-4.8) 0.8 x10^3/uL


(1.0-4.8)


 


Monocytes # (Auto)


 0.6 x10^3/uL


(0.0-1.1) 0.4 x10^3/uL


(0.0-1.1)


 


Eosinophils # (Auto)


 0.0 x10^3/uL


(0.0-0.7) 0.0 x10^3/uL


(0.0-0.7)


 


Basophils # (Auto)


 0.0 x10^3/uL


(0.0-0.2) 0.0 x10^3/uL


(0.0-0.2)


 


Sodium Level


 144 mmol/L


(136-145) 142 mmol/L


(136-145)


 


Potassium Level


 3.9 mmol/L


(3.5-5.1) 4.0 mmol/L


(3.5-5.1)


 


Chloride Level


 106 mmol/L


() 105 mmol/L


()


 


Carbon Dioxide Level


 28 mmol/L


(21-32) 26 mmol/L


(21-32)


 


Anion Gap 10 (6-14)  11 (6-14) 


 


Blood Urea Nitrogen


 9 mg/dL (7-20) 


 13 mg/dL


(7-20)


 


Creatinine


 0.8 mg/dL


(0.6-1.0) 0.8 mg/dL


(0.6-1.0)


 


Estimated GFR


(Cockcroft-Gault) 84.2 


 84.2 





 


BUN/Creatinine Ratio 11 (6-20)  


 


Glucose Level


 89 mg/dL


(70-99) 90 mg/dL


(70-99)


 


Calcium Level


 9.5 mg/dL


(8.5-10.1) 8.6 mg/dL


(8.5-10.1)


 


Total Bilirubin


 0.6 mg/dL


(0.2-1.0) 





 


Aspartate Amino Transf


(AST/SGOT) 16 U/L (15-37) 


 





 


Alanine Aminotransferase


(ALT/SGPT) 17 U/L (14-59) 


 





 


Alkaline Phosphatase


 53 U/L


() 





 


Total Protein


 7.5 g/dL


(6.4-8.2) 





 


Albumin


 3.9 g/dL


(3.4-5.0) 





 


Albumin/Globulin Ratio 1.1 (1.0-1.7)  








Assessment/Plan


s/p lap appy


ok to [place] home











SRINIVAS FAROOQ            Sep 12, 2019 13:28

## 2019-09-12 NOTE — NUR
Discharge teaching provided written and verbal to pt,understanding verbalized. Dismissed to 
home with all belongings accompanied by her . Transported to exit per w/c at 1400.

## 2019-09-16 NOTE — PATHOLOGY
[facility] Accession Number: [id number]

.                                                                01

Material submitted:                                        .

appendix - APPENDIX

.                                                                01

Clinical history:                                          .

Acute appendicitis

.                                                                02

**********************************************************************

Diagnosis:

Appendix, appendectomy:

- Acute appendicitis.

([facility]/; 9/13/19)

[facility]  09/13/2019  1551 Local

**********************************************************************

.                                                                02

Electronically signed:                                     .

Andrea Saeed MD, Pathologist

NPI- [id number]

.                                                                01

Gross description:                                         .

Received in formalin, labeled "Lissette Zambrano, appendix", is an intact

appendix (7.0 cm length x 0.6 cm diameter) and attached mesoappendix (7.5

x 2.2 x 0.8 cm).  The proximal resection margin is closed by a linear

staple line 1.7 cm with an average 0.2 cm width.  The staple line and the

adjacent serosa is inked black.  The serosa is tan-white and with no

perforation. The lumen minimally dilated and filled with hemorrhagic soft

material and no discrete fecalith. The wall has an average thickness of

0.1 cm. The willian-appendiceal soft tissue has a yellow cut surface.

Representatively submitted in A1. ([facility]; 9/12/2019)

[facility]  09/13/2019  1549 Local

.                                                                02

Pathologist provided ICD-10:

K35.80

.                                                                02

CPT                                                        .

[id number]

Specimen Comment: A courtesy copy of this report has been sent to

Specimen Comment: [phone], [phone], [phone].

Specimen Comment: Report sent to ,DR LOVE / DR BRUNNER

***Performed at:  [address] [place] [address]

   [address] [facility] [address], [place]  [id number]

   MD Herson Blanco MD Phone:  [phone]

***Performed at:  02

   [address]  [id number]

   MD Juan Romo MD Phone:  [phone]